# Patient Record
Sex: FEMALE | URBAN - METROPOLITAN AREA
[De-identification: names, ages, dates, MRNs, and addresses within clinical notes are randomized per-mention and may not be internally consistent; named-entity substitution may affect disease eponyms.]

---

## 2019-06-10 ENCOUNTER — APPOINTMENT (OUTPATIENT)
Age: 54
Setting detail: DERMATOLOGY
End: 2019-06-10

## 2019-06-10 DIAGNOSIS — L82.1 OTHER SEBORRHEIC KERATOSIS: ICD-10-CM

## 2019-06-10 DIAGNOSIS — L81.4 OTHER MELANIN HYPERPIGMENTATION: ICD-10-CM

## 2019-06-10 PROCEDURE — OTHER EDUCATIONAL RESOURCES PROVIDED: OTHER

## 2019-06-10 PROCEDURE — 99202 OFFICE O/P NEW SF 15 MIN: CPT

## 2019-06-10 PROCEDURE — OTHER COUNSELING: OTHER

## 2019-06-10 ASSESSMENT — LOCATION DETAILED DESCRIPTION DERM
LOCATION DETAILED: RIGHT SUPERIOR LATERAL MALAR CHEEK
LOCATION DETAILED: RIGHT CENTRAL MALAR CHEEK

## 2019-06-10 ASSESSMENT — LOCATION ZONE DERM: LOCATION ZONE: FACE

## 2019-06-10 ASSESSMENT — LOCATION SIMPLE DESCRIPTION DERM: LOCATION SIMPLE: RIGHT CHEEK

## 2019-06-10 NOTE — HPI: SKIN LESION
What Type Of Note Output Would You Prefer (Optional)?: Bullet Format
How Severe Is Your Skin Lesion?: mild
Has Your Skin Lesion Been Treated?: not been treated
Is This A New Presentation, Or A Follow-Up?: Skin Lesions
Which Family Member (Optional)?: Grandfather

## 2020-05-23 ENCOUNTER — APPOINTMENT (OUTPATIENT)
Dept: CT IMAGING | Facility: CLINIC | Age: 55
End: 2020-05-23
Attending: NURSE PRACTITIONER
Payer: COMMERCIAL

## 2020-05-23 ENCOUNTER — NURSE TRIAGE (OUTPATIENT)
Dept: NURSING | Facility: CLINIC | Age: 55
End: 2020-05-23

## 2020-05-23 ENCOUNTER — HOSPITAL ENCOUNTER (EMERGENCY)
Facility: CLINIC | Age: 55
Discharge: HOME | End: 2020-05-23
Attending: NURSE PRACTITIONER | Admitting: NURSE PRACTITIONER
Payer: COMMERCIAL

## 2020-05-23 VITALS
TEMPERATURE: 97.4 F | RESPIRATION RATE: 22 BRPM | SYSTOLIC BLOOD PRESSURE: 170 MMHG | HEART RATE: 58 BPM | DIASTOLIC BLOOD PRESSURE: 100 MMHG | OXYGEN SATURATION: 100 %

## 2020-05-23 DIAGNOSIS — N20.0 KIDNEY STONE: ICD-10-CM

## 2020-05-23 PROBLEM — Z96.649 STATUS POST THR (TOTAL HIP REPLACEMENT): Status: ACTIVE | Noted: 2018-01-08

## 2020-05-23 LAB
ALBUMIN UR-MCNC: NEGATIVE MG/DL
AMORPH CRY #/AREA URNS HPF: ABNORMAL /HPF
ANION GAP SERPL CALCULATED.3IONS-SCNC: 9 MMOL/L (ref 3–14)
APPEARANCE UR: ABNORMAL
BILIRUB UR QL STRIP: NEGATIVE
BUN SERPL-MCNC: 14 MG/DL (ref 7–30)
CALCIUM SERPL-MCNC: 8.9 MG/DL (ref 8.5–10.1)
CHLORIDE SERPL-SCNC: 108 MMOL/L (ref 94–109)
CO2 SERPL-SCNC: 23 MMOL/L (ref 20–32)
COLOR UR AUTO: ABNORMAL
CREAT SERPL-MCNC: 0.71 MG/DL (ref 0.52–1.04)
ERYTHROCYTE [DISTWIDTH] IN BLOOD BY AUTOMATED COUNT: 12.4 % (ref 10–15)
GFR SERPL CREATININE-BSD FRML MDRD: >90 ML/MIN/{1.73_M2}
GLUCOSE SERPL-MCNC: 115 MG/DL (ref 70–99)
GLUCOSE UR STRIP-MCNC: NEGATIVE MG/DL
HCT VFR BLD AUTO: 40.3 % (ref 35–47)
HGB BLD-MCNC: 13.4 G/DL (ref 11.7–15.7)
HGB UR QL STRIP: NEGATIVE
KETONES UR STRIP-MCNC: NEGATIVE MG/DL
LEUKOCYTE ESTERASE UR QL STRIP: NEGATIVE
MCH RBC QN AUTO: 30 PG (ref 26.5–33)
MCHC RBC AUTO-ENTMCNC: 33.3 G/DL (ref 31.5–36.5)
MCV RBC AUTO: 90 FL (ref 78–100)
MUCOUS THREADS #/AREA URNS LPF: PRESENT /LPF
NITRATE UR QL: NEGATIVE
PH UR STRIP: 7.5 PH (ref 5–7)
PLATELET # BLD AUTO: 300 10E9/L (ref 150–450)
POTASSIUM SERPL-SCNC: 3.5 MMOL/L (ref 3.4–5.3)
RBC # BLD AUTO: 4.46 10E12/L (ref 3.8–5.2)
RBC #/AREA URNS AUTO: 2 /HPF (ref 0–2)
SODIUM SERPL-SCNC: 140 MMOL/L (ref 133–144)
SOURCE: ABNORMAL
SP GR UR STRIP: 1.02 (ref 1–1.03)
SQUAMOUS #/AREA URNS AUTO: <1 /HPF (ref 0–1)
UROBILINOGEN UR STRIP-MCNC: NORMAL MG/DL (ref 0–2)
WBC # BLD AUTO: 7.7 10E9/L (ref 4–11)
WBC #/AREA URNS AUTO: 4 /HPF (ref 0–5)

## 2020-05-23 PROCEDURE — 25800030 ZZH RX IP 258 OP 636: Performed by: NURSE PRACTITIONER

## 2020-05-23 PROCEDURE — 96361 HYDRATE IV INFUSION ADD-ON: CPT

## 2020-05-23 PROCEDURE — 99285 EMERGENCY DEPT VISIT HI MDM: CPT | Mod: 25

## 2020-05-23 PROCEDURE — 96374 THER/PROPH/DIAG INJ IV PUSH: CPT

## 2020-05-23 PROCEDURE — 80048 BASIC METABOLIC PNL TOTAL CA: CPT | Performed by: NURSE PRACTITIONER

## 2020-05-23 PROCEDURE — 96375 TX/PRO/DX INJ NEW DRUG ADDON: CPT

## 2020-05-23 PROCEDURE — 25000128 H RX IP 250 OP 636: Performed by: NURSE PRACTITIONER

## 2020-05-23 PROCEDURE — 25000132 ZZH RX MED GY IP 250 OP 250 PS 637: Performed by: NURSE PRACTITIONER

## 2020-05-23 PROCEDURE — 74176 CT ABD & PELVIS W/O CONTRAST: CPT

## 2020-05-23 PROCEDURE — 85027 COMPLETE CBC AUTOMATED: CPT | Performed by: NURSE PRACTITIONER

## 2020-05-23 PROCEDURE — 81001 URINALYSIS AUTO W/SCOPE: CPT | Performed by: NURSE PRACTITIONER

## 2020-05-23 RX ORDER — KETOROLAC TROMETHAMINE 15 MG/ML
15 INJECTION, SOLUTION INTRAMUSCULAR; INTRAVENOUS ONCE
Status: COMPLETED | OUTPATIENT
Start: 2020-05-23 | End: 2020-05-23

## 2020-05-23 RX ORDER — HYDROMORPHONE HYDROCHLORIDE 1 MG/ML
0.5 INJECTION, SOLUTION INTRAMUSCULAR; INTRAVENOUS; SUBCUTANEOUS ONCE
Status: COMPLETED | OUTPATIENT
Start: 2020-05-23 | End: 2020-05-23

## 2020-05-23 RX ORDER — ONDANSETRON 2 MG/ML
4 INJECTION INTRAMUSCULAR; INTRAVENOUS ONCE
Status: COMPLETED | OUTPATIENT
Start: 2020-05-23 | End: 2020-05-23

## 2020-05-23 RX ORDER — DIMENHYDRINATE 50 MG
50 TABLET ORAL ONCE
Status: COMPLETED | OUTPATIENT
Start: 2020-05-23 | End: 2020-05-23

## 2020-05-23 RX ADMIN — KETOROLAC TROMETHAMINE 15 MG: 15 INJECTION, SOLUTION INTRAMUSCULAR; INTRAVENOUS at 20:53

## 2020-05-23 RX ADMIN — DIMENHYDRINATE 50 MG: 50 TABLET ORAL at 20:55

## 2020-05-23 RX ADMIN — ONDANSETRON 4 MG: 2 INJECTION INTRAMUSCULAR; INTRAVENOUS at 20:53

## 2020-05-23 RX ADMIN — HYDROMORPHONE HYDROCHLORIDE 0.5 MG: 1 INJECTION, SOLUTION INTRAMUSCULAR; INTRAVENOUS; SUBCUTANEOUS at 20:55

## 2020-05-23 RX ADMIN — SODIUM CHLORIDE 1000 ML: 9 INJECTION, SOLUTION INTRAVENOUS at 20:55

## 2020-05-23 ASSESSMENT — ENCOUNTER SYMPTOMS
VOMITING: 0
CHILLS: 0
NAUSEA: 1
FEVER: 0
ABDOMINAL PAIN: 1
FLANK PAIN: 1

## 2020-05-23 NOTE — ED AVS SNAPSHOT
[facility] Emergency Department  [address]  [facility] [address]  Phone:  [phone]  Fax:  [phone]                                    Flores Sandhu   MRN: [id number]    Department:  [facility] Emergency Department   Date of Visit:  5/23/2020           After Visit Summary Signature Page    I have received my discharge instructions, and my questions have been answered. I have discussed any challenges I see with this plan with the nurse or doctor.    ..........................................................................................................................................  Patient/Patient Representative Signature      ..........................................................................................................................................  Patient Representative Print Name and Relationship to Patient    ..................................................               ................................................  Date                                   Time    ..........................................................................................................................................  Reviewed by Signature/Title    ...................................................              ..............................................  Date                                               Time          22EPIC Rev 08/18

## 2020-05-24 NOTE — ED PROVIDER NOTES
History     Chief Complaint:  Flank Pain    HPI  Flores Sandhu is a 55 year old female with a history of uterine fibroids who presents to the emergency department for evaluation of right sided flank pain. This came on suddenly an hour ago in her low back and then localized to her right flank. The patient also reports urinary urgency and some pelvic pain. She denies any blood in her urine.       Allergies:  Penicillins     Medications:    The patient is not currently taking any prescribed medications.    Past Medical History:    Osteoarthritis  Varicose veins  DJD  Adenomyosis  Uterine fibroid  Menometrorrhagia   Diverticulitis     Past Surgical History:    Bladder repair    Family History:    The patient's family history is not on file.    Social History:  The patient arrives alone  Marital Status:  [2]      Review of Systems   Constitutional: Negative for chills and fever.   Gastrointestinal: Positive for abdominal pain and nausea. Negative for vomiting.   Genitourinary: Positive for flank pain and urgency.   All other systems reviewed and are negative.      Physical Exam     Patient Vitals for the past 24 hrs:   BP Temp Temp src Pulse Resp SpO2   05/23/20 2142 -- -- -- -- -- 100 %   05/23/20 2137 -- -- -- -- -- 99 %   05/23/20 2136 (!) 170/100 -- -- 58 -- --   05/23/20 2032 (!) 167/111 97.4  F (36.3  C) Oral 72 22 100 %     Physical Exam  General: Alert, Moderate/severe discomfort, well kept  Eyes: PERRL, conjunctivae pink no scleral icterus or conjunctival injection  ENT:   Moist mucus membranes, posterior oropharynx clear without erythema or exudates, No lymphadenopathy, Normal voice  Resp:  Lungs clear to auscultation bilaterally, no crackles/rubs/wheezes. Good air movement  CV:  Normal rate and rhythm, no murmurs/rubs/gallops  GI:  Abdomen soft and non-distended.  Normoactive BS. Right CVA and lower abd  tenderness, No guarding or rebound, No masses  Skin:  Warm, dry.  No rashes or  petechiae  Musculoskeletal: No peripheral edema or calf tenderness, Normal gross ROM   Neuro: Alert and oriented to person/place/time, normal sensation  Psychiatric: Normal affect, cooperative, good eye contact    Emergency Department Course     Imaging:  Radiology findings were communicated with the patient who voiced understanding of the findings.    CT Abdomen Pelvis wo contrast   IMPRESSION:   1.  Right hydronephrosis and hydroureter secondary to a recently passed 2 mm stone in the bladder.   2.  Extensive colonic diverticulosis   Reading per radiology    Laboratory:  Laboratory findings were communicated with the patient who voiced understanding of the findings.    CBC:  o/w WNL. (WBC 7.7, HGB 13.4, )   BMP: Glucose 115 (H), o/w WNL (Creatinine: 0.71)    UA: pH urine, mucus present, amorphous crystals few o/w WNL    Interventions:  2053 Toradol, 15 mg, IV  2053 Zofran 4mg IV injection   2055 Dilaudid 0.5mg IV injection  2055 Dramamine 50 mg PO  2055 NS 1L IV Bolus    Emergency Department Course:  Past medical records, nursing notes, and vitals reviewed.  2040: I performed an exam of the patient and obtained history, as documented above.     IV was inserted and blood was drawn for laboratory testing, results above.  The patient was sent for a CT while in the emergency department, findings above    2149: I rechecked the patient. Explained findings to patient.    I personally reviewed the laboratory and imaging results with the Patient and answered all related questions prior to discharge.     Findings and plan explained to the Patient. Patient discharged home with instructions regarding supportive care, medications, and reasons to return. The importance of close follow-up was reviewed.   Impression & Plan      Medical Decision Making:  Flores Sandhu is a 55 year old female who presented with unilateral flank & abdominal pain consistent with renal colic. CT confirms a 2mm ureteral stone in the bladder.   Renal function is normal/baseline.  CT and lab workup show no other alternative etiology that could be causing her symptoms (e.g., AAA, appendicitis, pyelonephritis). There is no fever or convincing evidence of a urinary tract infection. On recheck, her pain is controlled with interventions in the ED and she is tolerating POs. I will prescribe supportive medications and Dramamine to facilitate stone passage. I have advised her to return for uncontrolled pain, vomiting, fever, or any other concerning symptoms. I also advised to strain her urine to look for a stone and submit it to her primary doctor for lab analysis.  Finally, I have advised follow up with urology or primary care within 3-5 days.      Diagnosis:    ICD-10-CM   1. Kidney stone  N20.0       Disposition:   discharged to home      Scribe Disclosure:  I, Brittni Swanson, am serving as a scribe at 8:40 PM on 5/23/2020 to document services personally performed by Surya Law APRN based on my observations and the provider's statements to me.    [facility] EMERGENCY DEPARTMENT     Surya Law APRN CNP  05/23/20 2208

## 2020-05-24 NOTE — TELEPHONE ENCOUNTER
Severe right lower back pain that goes to abdomen.  Will go to ER.  Denies Covid-19 symptoms.    Berenice Mooney RN  [facility]          Reason for Disposition    [1] SEVERE pain (e.g., excruciating, scale 8-10) AND [2] present > 1 hour    Additional Information    Negative: Passed out (i.e., lost consciousness, collapsed and was not responding)    Negative: Shock suspected (e.g., cold/pale/clammy skin, too weak to stand, low BP, rapid pulse)    Negative: Difficult to awaken or acting confused (e.g., disoriented, slurred speech)    Negative: Sounds like a life-threatening emergency to the triager    Protocols used: FLANK PAIN-A-AH

## 2022-11-30 ENCOUNTER — APPOINTMENT (OUTPATIENT)
Dept: CT IMAGING | Facility: CLINIC | Age: 57
End: 2022-11-30
Attending: EMERGENCY MEDICINE
Payer: COMMERCIAL

## 2022-11-30 ENCOUNTER — HOSPITAL ENCOUNTER (EMERGENCY)
Facility: CLINIC | Age: 57
Discharge: HOME | End: 2022-11-30
Attending: EMERGENCY MEDICINE | Admitting: EMERGENCY MEDICINE
Payer: COMMERCIAL

## 2022-11-30 VITALS
TEMPERATURE: 99 F | DIASTOLIC BLOOD PRESSURE: 117 MMHG | WEIGHT: 186 LBS | RESPIRATION RATE: 18 BRPM | SYSTOLIC BLOOD PRESSURE: 187 MMHG | OXYGEN SATURATION: 100 % | HEART RATE: 80 BPM

## 2022-11-30 DIAGNOSIS — R00.2 PALPITATIONS: ICD-10-CM

## 2022-11-30 DIAGNOSIS — I10 UNCONTROLLED HYPERTENSION: ICD-10-CM

## 2022-11-30 LAB
ALBUMIN UR-MCNC: NEGATIVE MG/DL
ANION GAP SERPL CALCULATED.3IONS-SCNC: 11 MMOL/L (ref 7–15)
APPEARANCE UR: CLEAR
BASOPHILS # BLD AUTO: 0 10E3/UL (ref 0–0.2)
BASOPHILS NFR BLD AUTO: 1 %
BILIRUB UR QL STRIP: NEGATIVE
BUN SERPL-MCNC: 14.8 MG/DL (ref 6–20)
CALCIUM SERPL-MCNC: 9.8 MG/DL (ref 8.6–10)
CHLORIDE SERPL-SCNC: 103 MMOL/L (ref 98–107)
COLOR UR AUTO: NORMAL
CREAT SERPL-MCNC: 0.78 MG/DL (ref 0.51–0.95)
DEPRECATED HCO3 PLAS-SCNC: 26 MMOL/L (ref 22–29)
EOSINOPHIL # BLD AUTO: 0.3 10E3/UL (ref 0–0.7)
EOSINOPHIL NFR BLD AUTO: 5 %
ERYTHROCYTE [DISTWIDTH] IN BLOOD BY AUTOMATED COUNT: 12.5 % (ref 10–15)
GFR SERPL CREATININE-BSD FRML MDRD: 89 ML/MIN/1.73M2
GLUCOSE SERPL-MCNC: 116 MG/DL (ref 70–99)
GLUCOSE UR STRIP-MCNC: NEGATIVE MG/DL
HCT VFR BLD AUTO: 42.1 % (ref 35–47)
HGB BLD-MCNC: 13.7 G/DL (ref 11.7–15.7)
HGB UR QL STRIP: NEGATIVE
HOLD SPECIMEN: NORMAL
HOLD SPECIMEN: NORMAL
IMM GRANULOCYTES # BLD: 0 10E3/UL
IMM GRANULOCYTES NFR BLD: 0 %
KETONES UR STRIP-MCNC: NEGATIVE MG/DL
LEUKOCYTE ESTERASE UR QL STRIP: NEGATIVE
LYMPHOCYTES # BLD AUTO: 1.8 10E3/UL (ref 0.8–5.3)
LYMPHOCYTES NFR BLD AUTO: 31 %
MCH RBC QN AUTO: 29.2 PG (ref 26.5–33)
MCHC RBC AUTO-ENTMCNC: 32.5 G/DL (ref 31.5–36.5)
MCV RBC AUTO: 90 FL (ref 78–100)
MONOCYTES # BLD AUTO: 0.3 10E3/UL (ref 0–1.3)
MONOCYTES NFR BLD AUTO: 6 %
NEUTROPHILS # BLD AUTO: 3.4 10E3/UL (ref 1.6–8.3)
NEUTROPHILS NFR BLD AUTO: 57 %
NITRATE UR QL: NEGATIVE
NRBC # BLD AUTO: 0 10E3/UL
NRBC BLD AUTO-RTO: 0 /100
PH UR STRIP: 6.5 [PH] (ref 5–7)
PLATELET # BLD AUTO: 263 10E3/UL (ref 150–450)
POTASSIUM SERPL-SCNC: 3.6 MMOL/L (ref 3.4–5.3)
RBC # BLD AUTO: 4.69 10E6/UL (ref 3.8–5.2)
RBC URINE: <1 /HPF
SODIUM SERPL-SCNC: 140 MMOL/L (ref 136–145)
SP GR UR STRIP: 1.02 (ref 1–1.03)
SQUAMOUS EPITHELIAL: 1 /HPF
TROPONIN T SERPL HS-MCNC: <6 NG/L
UROBILINOGEN UR STRIP-MCNC: NORMAL MG/DL
WBC # BLD AUTO: 5.8 10E3/UL (ref 4–11)
WBC URINE: <1 /HPF

## 2022-11-30 PROCEDURE — 80048 BASIC METABOLIC PNL TOTAL CA: CPT | Performed by: EMERGENCY MEDICINE

## 2022-11-30 PROCEDURE — 36415 COLL VENOUS BLD VENIPUNCTURE: CPT | Performed by: EMERGENCY MEDICINE

## 2022-11-30 PROCEDURE — 85025 COMPLETE CBC W/AUTO DIFF WBC: CPT | Performed by: EMERGENCY MEDICINE

## 2022-11-30 PROCEDURE — 81001 URINALYSIS AUTO W/SCOPE: CPT | Performed by: EMERGENCY MEDICINE

## 2022-11-30 PROCEDURE — 99285 EMERGENCY DEPT VISIT HI MDM: CPT | Mod: 25

## 2022-11-30 PROCEDURE — 84484 ASSAY OF TROPONIN QUANT: CPT | Performed by: EMERGENCY MEDICINE

## 2022-11-30 PROCEDURE — 93005 ELECTROCARDIOGRAM TRACING: CPT

## 2022-11-30 PROCEDURE — 70450 CT HEAD/BRAIN W/O DYE: CPT

## 2022-11-30 RX ORDER — HYDROCHLOROTHIAZIDE 12.5 MG/1
12.5 TABLET ORAL DAILY
Qty: 30 TABLET | Refills: 0 | Status: SHIPPED | OUTPATIENT
Start: 2022-11-30 | End: 2022-12-28

## 2022-11-30 ASSESSMENT — ENCOUNTER SYMPTOMS
HEADACHES: 1
PALPITATIONS: 1

## 2022-11-30 ASSESSMENT — ACTIVITIES OF DAILY LIVING (ADL): ADLS_ACUITY_SCORE: 35

## 2022-11-30 NOTE — ED NOTES
Rapid Assessment Note    History:   Flores Sandhu is a 57 year old female who presents with hypertension. The patient states that she went to see her parents for a couple days and forgot to bring her Losartan. She notes that the two days she missed them, she had headaches. The day she returned, she took her medications immediately, but experienced two episodes of a full body throbbing sensation, both of which woke her up around 4:30 AM (11/19 and 11/26) Both days, she checked her pulse and they were 94 bpm and 89 bpm respectively. She denies any recent significant life changes.     Exam:   General:  Alert, interactive  Cardiovascular:  Well perfused  Lungs:  No respiratory distress, no accessory muscle use  Neuro:  Moving all 4 extremities  Skin:  Warm, dry  Psych:  Normal affect    Plan of Care:   I evaluated the patient and developed an initial plan of care. I discussed this plan and explained that I, or one of my partners, would be returning to complete the evaluation.     I, Wero Curran, am serving as a scribe to document services personally performed by Geo Crowell MD, based on my observations and the provider's statements to me.    11/30/2022  [facility] [employer] [facility]    Portions of this medical record were completed by a scribe. UPON MY REVIEW AND AUTHENTICATION BY ELECTRONIC SIGNATURE, this confirms (a) I performed the applicable clinical services, and (b) the record is accurate.        Geo Crowell MD  11/30/22 [id number]

## 2022-11-30 NOTE — DISCHARGE INSTRUCTIONS
Please follow-up in cardiology clinic as we discussed.  You will need to have a heart monitor placed to evaluate the palpitations.    Your new blood pressure medication is called hydrochlorothiazide.  You will need to have repeat lab work checked within 1 week either in the cardiology clinic or with your primary care clinic.

## 2022-11-30 NOTE — ED PROVIDER NOTES
History   Chief Complaint:  Hypertension       The history is provided by the patient.      Flores Sandhu is a 57 year old female who presents with hypertension. The patient states that she went to see her parents for a couple days and forgot to bring her Losartan. She notes that the two days she missed them, she had headaches. The day she returned, she took her medications immediately, but experienced two episodes of a full body throbbing sensation, both of which woke her up around 4:30 AM (11/19 and 11/26) Both days, she checked her pulse and they were 94 bpm and 89 bpm respectively. She denies any recent significant life changes.    Review of Systems   Cardiovascular: Positive for palpitations.   Neurological: Positive for headaches.   All other systems reviewed and are negative.    Allergies:  Penicillins  Sulfa Drugs  Lisinopril    Medications:  Losartan    Past Medical History:     Adenomyosis  Menometrorrhagia  Uterine fibroid  DJD  Diverticulitis  PMS    Past Surgical History:    Bladder repair  Meniscus repair  Hip replacement  Knee replacement  Total abdominal hysterectomy  Incontinence surgery    Social History:  The patient presents to the ED alone.  PCP: Zaida Gandara     Physical Exam     No data found.    Physical Exam  Constitutional:       General: She is not in acute distress.     Appearance: She is not diaphoretic.   HENT:      Head: Atraumatic.      Mouth/Throat:      Pharynx: No oropharyngeal exudate.   Eyes:      General: No scleral icterus.     Pupils: Pupils are equal, round, and reactive to light.   Cardiovascular:      Heart sounds: Normal heart sounds.   Pulmonary:      Effort: No respiratory distress.      Breath sounds: Normal breath sounds.   Abdominal:      General: Bowel sounds are normal.      Palpations: Abdomen is soft.      Tenderness: There is no abdominal tenderness.   Musculoskeletal:         General: No tenderness.   Skin:     General: Skin is warm.      Findings: No  rash.       Emergency Department Course   ECG  ECG results from 11/30/22   EKG 12 lead     Value    Systolic Blood Pressure     Diastolic Blood Pressure     Ventricular Rate 73    Atrial Rate 73    NC Interval 170    QRS Duration 86        QTc 405    P Axis 72    R AXIS 54    T Axis 29    Interpretation ECG      Sinus rhythm  Normal ECG  No previous ECGs available  Confirmed by - EMERGENCY ROOM, PHYSICIAN ([id number]),  MARY GRACE SERRA (Latonya) on 12/1/2022 7:23:31 AM         Imaging:  CT Head w/o Contrast   Final Result   IMPRESSION: No evidence of acute intracranial hemorrhage, mass, or   herniation.         CARY CORDERO MD            SYSTEM ID:  [id number]        Report per radiology    Laboratory:  Labs Ordered and Resulted from Time of ED Arrival to Time of ED Departure   BASIC METABOLIC PANEL - Abnormal       Result Value    Sodium 140      Potassium 3.6      Chloride 103      Carbon Dioxide (CO2) 26      Anion Gap 11      Urea Nitrogen 14.8      Creatinine 0.78      Calcium 9.8      Glucose 116 (*)     GFR Estimate 89     TROPONIN T, HIGH SENSITIVITY - Normal    Troponin T, High Sensitivity <6     ROUTINE UA WITH MICROSCOPIC REFLEX TO CULTURE - Normal    Color Urine Light Yellow      Appearance Urine Clear      Glucose Urine Negative      Bilirubin Urine Negative      Ketones Urine Negative      Specific Gravity Urine 1.016      Blood Urine Negative      pH Urine 6.5      Protein Albumin Urine Negative      Urobilinogen Urine Normal      Nitrite Urine Negative      Leukocyte Esterase Urine Negative      RBC Urine <1      WBC Urine <1      Squamous Epithelials Urine 1     CBC WITH PLATELETS AND DIFFERENTIAL    WBC Count 5.8      RBC Count 4.69      Hemoglobin 13.7      Hematocrit 42.1      MCV 90      MCH 29.2      MCHC 32.5      RDW 12.5      Platelet Count 263      % Neutrophils 57      % Lymphocytes 31      % Monocytes 6      % Eosinophils 5      % Basophils 1      % Immature Granulocytes 0      NRBCs  per 100 WBC 0      Absolute Neutrophils 3.4      Absolute Lymphocytes 1.8      Absolute Monocytes 0.3      Absolute Eosinophils 0.3      Absolute Basophils 0.0      Absolute Immature Granulocytes 0.0      Absolute NRBCs 0.0        Emergency Department Course:     Reviewed:  I reviewed nursing notes, vitals, past medical history and Care Everywhere    Assessments:  1542 I obtained history and examined the patient as noted above.   1656 I rechecked the patient, explained findings, and prepared for discharge.     Disposition:  The patient was discharged to home.     Impression & Plan   Medical Decision Making:  Work-up is reassuring.  No signs of endorgan dysfunction from hypertension.  Given her palpitations she will be referred to cardiology for consideration of a Zio patch and for recheck of her blood pressure.  We discussed management options and have elected to start low-dose hydrochlorothiazide today.  She will need recheck of her electrolytes as well.  Return precautions discussed.    Diagnosis:    ICD-10-CM    1. Uncontrolled hypertension  I10 Follow-Up with Cardiology      2. Palpitations  R00.2 Follow-Up with Cardiology     Leadless EKG Monitor 3 to 7 Days          Discharge Medications:  Discharge Medication List as of 11/30/2022  5:15 PM      START taking these medications    Details   hydrochlorothiazide (HYDRODIURIL) 12.5 MG tablet Take 1 tablet (12.5 mg) by mouth daily for 30 days, Disp-30 tablet, R-0, E-Prescribe             Scribe Disclosure:  I, Wero Curran, am serving as a scribe at 4:56 PM on 11/30/2022 to document services personally performed by Geo Crowell, based on my observations and the provider's statements to me.            Geo Crowell MD  12/13/22 3072

## 2022-11-30 NOTE — ED NOTES
Discharge instructions reviewed. Patient agrees to monitor her blood pressure, take her blood pressure medications and follow up with cardiology, leadless EKG monitoring and her doctor. Denies questions or concerns at the time of discharge.

## 2022-11-30 NOTE — ED TRIAGE NOTES
"Pt has been waking up throughout the night with palpitations the past week. Pt called to have PCP appt for BP check. Triage nurse had pt take BP at home today which was elevated so recommended pt come to ED. Here, pt also c/o throbbing HA since 1230. States she has been having intermittent headaches \"for a while now.\" ABCs intact. No unilateral weakness, difficulty ambulating, vision changes, or any other sx.     "

## 2022-12-01 ENCOUNTER — HOSPITAL ENCOUNTER (OUTPATIENT)
Dept: CARDIOLOGY | Facility: CLINIC | Age: 57
Discharge: HOME | End: 2022-12-01
Attending: EMERGENCY MEDICINE | Admitting: EMERGENCY MEDICINE
Payer: COMMERCIAL

## 2022-12-01 DIAGNOSIS — R00.2 PALPITATIONS: ICD-10-CM

## 2022-12-01 LAB
ATRIAL RATE - MUSE: 73 BPM
DIASTOLIC BLOOD PRESSURE - MUSE: NORMAL MMHG
INTERPRETATION ECG - MUSE: NORMAL
P AXIS - MUSE: 72 DEGREES
PR INTERVAL - MUSE: 170 MS
QRS DURATION - MUSE: 86 MS
QT - MUSE: 368 MS
QTC - MUSE: 405 MS
R AXIS - MUSE: 54 DEGREES
SYSTOLIC BLOOD PRESSURE - MUSE: NORMAL MMHG
T AXIS - MUSE: 29 DEGREES
VENTRICULAR RATE- MUSE: 73 BPM

## 2022-12-01 PROCEDURE — 93242 EXT ECG>48HR<7D RECORDING: CPT

## 2022-12-01 PROCEDURE — 93244 EXT ECG>48HR<7D REV&INTERPJ: CPT | Performed by: INTERNAL MEDICINE

## 2022-12-28 ENCOUNTER — LAB (OUTPATIENT)
Dept: LAB | Facility: CLINIC | Age: 57
End: 2022-12-28
Payer: COMMERCIAL

## 2022-12-28 ENCOUNTER — OFFICE VISIT (OUTPATIENT)
Dept: CARDIOLOGY | Facility: CLINIC | Age: 57
End: 2022-12-28
Attending: EMERGENCY MEDICINE
Payer: COMMERCIAL

## 2022-12-28 VITALS
OXYGEN SATURATION: 98 % | SYSTOLIC BLOOD PRESSURE: 154 MMHG | HEIGHT: 66 IN | BODY MASS INDEX: 30.91 KG/M2 | WEIGHT: 192.3 LBS | HEART RATE: 72 BPM | DIASTOLIC BLOOD PRESSURE: 102 MMHG

## 2022-12-28 DIAGNOSIS — I10 UNCONTROLLED HYPERTENSION: ICD-10-CM

## 2022-12-28 DIAGNOSIS — G47.33 OBSTRUCTIVE SLEEP APNEA SYNDROME: ICD-10-CM

## 2022-12-28 DIAGNOSIS — R00.2 PALPITATIONS: ICD-10-CM

## 2022-12-28 DIAGNOSIS — R06.09 DYSPNEA ON EXERTION: Primary | ICD-10-CM

## 2022-12-28 LAB
ANION GAP SERPL CALCULATED.3IONS-SCNC: 11 MMOL/L (ref 7–15)
BUN SERPL-MCNC: 9.3 MG/DL (ref 6–20)
CALCIUM SERPL-MCNC: 9.8 MG/DL (ref 8.6–10)
CHLORIDE SERPL-SCNC: 104 MMOL/L (ref 98–107)
CREAT SERPL-MCNC: 0.73 MG/DL (ref 0.51–0.95)
DEPRECATED HCO3 PLAS-SCNC: 25 MMOL/L (ref 22–29)
GFR SERPL CREATININE-BSD FRML MDRD: >90 ML/MIN/1.73M2
GLUCOSE SERPL-MCNC: 112 MG/DL (ref 70–99)
MAGNESIUM SERPL-MCNC: 2 MG/DL (ref 1.7–2.3)
POTASSIUM SERPL-SCNC: 4.1 MMOL/L (ref 3.4–5.3)
SODIUM SERPL-SCNC: 140 MMOL/L (ref 136–145)
TSH SERPL DL<=0.005 MIU/L-ACNC: 1.35 UIU/ML (ref 0.3–4.2)

## 2022-12-28 PROCEDURE — 84443 ASSAY THYROID STIM HORMONE: CPT | Performed by: INTERNAL MEDICINE

## 2022-12-28 PROCEDURE — 36415 COLL VENOUS BLD VENIPUNCTURE: CPT | Performed by: INTERNAL MEDICINE

## 2022-12-28 PROCEDURE — 80048 BASIC METABOLIC PNL TOTAL CA: CPT | Performed by: INTERNAL MEDICINE

## 2022-12-28 PROCEDURE — 83735 ASSAY OF MAGNESIUM: CPT | Performed by: INTERNAL MEDICINE

## 2022-12-28 PROCEDURE — 99205 OFFICE O/P NEW HI 60 MIN: CPT | Performed by: INTERNAL MEDICINE

## 2022-12-28 RX ORDER — AMLODIPINE BESYLATE 5 MG/1
5 TABLET ORAL DAILY
Qty: 90 TABLET | Refills: 3 | Status: SHIPPED | OUTPATIENT
Start: 2022-12-28 | End: 2023-07-11 | Stop reason: DRUGHIGH

## 2022-12-28 RX ORDER — LOSARTAN POTASSIUM 100 MG/1
1 TABLET ORAL DAILY
COMMUNITY
Start: 2022-12-26

## 2022-12-28 RX ORDER — LOSARTAN POTASSIUM 50 MG/1
50 TABLET ORAL DAILY
COMMUNITY
Start: 2022-09-21 | End: 2022-12-28

## 2022-12-28 NOTE — PATIENT INSTRUCTIONS
December 28, 2022    Thank you for allowing our Cardiology team to participate in your care.     Please note the following changes to your heart treatment plan:     Medication changes:   - start amlodipine 5mg daily    Tests to be done:  - non-fasting labs  - TTE (heart ultrasound)  - Exercise stress test    Follow up:  - Follow up in about 1-2 months with cardiology SYLVIA, or sooner as needed.      For scheduling, please call [phone].    Please contact our team at [phone] (Carmita VALLEJO) or [phone] for any questions or concerns.     If you are having a medical emergency, please call [phone].     Sincerely,    Addi Ford MD, FACC  Cardiology    [facility]

## 2022-12-28 NOTE — PROGRESS NOTES
"    Cardiology Clinic Consultation:    December 28, 2022   Patient Name: Flores Sandhu  Patient MRN: [id number]    Consult indication: HTN    HPI and Assessment and Plan/Recommendations:    Please see dictation. #[id number]    Thank you for allowing our team to participate in the care of Flores Sandhu.  Please do not hesitate to call or page me with any questions or concerns.    Sincerely,     Addi Ford MD, [facility]  Cardiology  December 28, 2022    cc  Geo Crowell MD  [address]  [place] [address]    Total time spent on this encounter: 60 minutes, providing care in this encounter including, but not limited to, reviewing prior medical records, laboratory data, imaging studies, diagnostic studies, procedure notes, formulating an assessment and plan, recommendations, discussion and counseling with patient face to face, dictation.    Past Medical History:     Patient Active Problem List   Diagnosis     Status post THR (total hip replacement)     Palpitations     Uncontrolled hypertension       Past Surgical History:   No prior cardiac surgical history    Medications (outpatient):  Current Outpatient Medications   Medication Sig Dispense Refill     amLODIPine (NORVASC) 5 MG tablet Take 1 tablet (5 mg) by mouth daily 90 tablet 3     losartan (COZAAR) 100 MG tablet Take 1 tablet by mouth daily         Allergies:  Allergies   Allergen Reactions     Penicillins Unknown     4/27/2015 Test dosed cefazolin with no side effects seen     Sulfa Drugs Hives       Social History:   No smoking history  Occasional EtOH use    Review of Systems:   A comprehensive 12 system review of systems was carried out.  Pertinent positives and negatives are noted above. Otherwise negative for contributory information.    Objective & Physical Exam:  BP (!) 154/102 (BP Location: Right arm, Patient Position: Sitting, Cuff Size: Adult Regular)   Pulse 72   Ht 1.676 m (5' 6\")   Wt 87.2 kg (192 lb 4.8 " oz)   SpO2 98%   BMI 31.04 kg/m    Wt Readings from Last 2 Encounters:   12/28/22 87.2 kg (192 lb 4.8 oz)   11/30/22 84.4 kg (186 lb)     Body mass index is 31.04 kg/m .   Body surface area is 2.02 meters squared.    Constitutional: appears stated age, in no apparent distress, appears to be well nourished  Head: normocephalic, atraumatic  Neck: supple, trachea midline  Pulmonary: clear to auscultation bilaterally, no wheezes, no rales, no increased work of breathing  Cardiovascular: JVP normal, regular rate, regular rhythm, normal S1 and S2, no S3, S4, no murmur appreciated, no lower extremity edema  Gastrointestinal: no guarding, non-rigid   Neurologic: awake, alert, moves all extremities  Skin: no jaundice, warm on limited exam  Psychiatric: affect is normal, answers questions appropriately, oriented to self and place    Data reviewed:  Lab Results   Component Value Date    WBC 5.8 11/30/2022    WBC 7.7 05/23/2020    RBC 4.69 11/30/2022    RBC 4.46 05/23/2020    HGB 13.7 11/30/2022    HGB 13.4 05/23/2020    HCT 42.1 11/30/2022    HCT 40.3 05/23/2020    MCV 90 11/30/2022    MCV 90 05/23/2020    MCH 29.2 11/30/2022    MCH 30.0 05/23/2020    MCHC 32.5 11/30/2022    MCHC 33.3 05/23/2020    RDW 12.5 11/30/2022    RDW 12.4 05/23/2020     11/30/2022     05/23/2020     Sodium   Date Value Ref Range Status   11/30/2022 140 136 - 145 mmol/L Final   05/23/2020 140 133 - 144 mmol/L Final     Potassium   Date Value Ref Range Status   11/30/2022 3.6 3.4 - 5.3 mmol/L Final   05/23/2020 3.5 3.4 - 5.3 mmol/L Final     Chloride   Date Value Ref Range Status   11/30/2022 103 98 - 107 mmol/L Final   05/23/2020 108 94 - 109 mmol/L Final     Carbon Dioxide   Date Value Ref Range Status   05/23/2020 23 20 - 32 mmol/L Final     Carbon Dioxide (CO2)   Date Value Ref Range Status   11/30/2022 26 22 - 29 mmol/L Final     Anion Gap   Date Value Ref Range Status   11/30/2022 11 7 - 15 mmol/L Final   05/23/2020 9 3 - 14 mmol/L  Final     Glucose   Date Value Ref Range Status   11/30/2022 116 (H) 70 - 99 mg/dL Final   05/23/2020 115 (H) 70 - 99 mg/dL Final     Urea Nitrogen   Date Value Ref Range Status   11/30/2022 14.8 6.0 - 20.0 mg/dL Final   05/23/2020 14 7 - 30 mg/dL Final     Creatinine   Date Value Ref Range Status   11/30/2022 0.78 0.51 - 0.95 mg/dL Final   05/23/2020 0.71 0.52 - 1.04 mg/dL Final     GFR Estimate   Date Value Ref Range Status   11/30/2022 89 >60 mL/min/1.73m2 Final     Comment:     Effective December 21, 2021 eGFRcr in adults is calculated using the 2021 CKD-EPI creatinine equation which includes age and gender (Jimbo et al., NEJM, DOI: 10.1056/VDZBis4517455)   05/23/2020 >90 >60 mL/min/[1.73_m2] Final     Comment:     Non  GFR Calc  Starting 12/18/2018, serum creatinine based estimated GFR (eGFR) will be   calculated using the Chronic Kidney Disease Epidemiology Collaboration   (CKD-EPI) equation.       Calcium   Date Value Ref Range Status   11/30/2022 9.8 8.6 - 10.0 mg/dL Final   05/23/2020 8.9 8.5 - 10.1 mg/dL Final

## 2022-12-28 NOTE — PROGRESS NOTES
Service Date: 12/28/2022    CONSULT INDICATION:  Hypertension.    HISTORY OF PRESENT ILLNESS:      I had the opportunity to see patient, Flores Sandhu, today in Cardiology Clinic for a consultation.  She is followed by our colleague, Brittany Frye, with Primary Care.    As you know, patient is a pleasant 57-year-old female with a past medical history significant for obesity and hypertension, who presents for further evaluation and management of hypertension.    The patient reports that at baseline, she exercises regularly by walking for 2-4 miles almost every day.  She also engages in exercises such as yoga.  She denies any chest pain, chest pressure or associated palpitations.  However, she has noticed more dyspnea on exertion over the past couple of months or so.  She was recently visiting her parents and forgot to bring her losartan and so she had missed approximately 2 days of her losartan and developed symptoms consistent with hypertension, such as headache.  She resumed her losartan.  However, her blood pressures remained elevated.  She called her primary care team and was directed to the Emergency Department.  In the Emergency Department 11/30/2022, ECG demonstrated normal sinus rhythm without acute ischemic changes.  A CT head was negative.  Labs were unremarkable.  High-sensitivity troponin was normal.  Unfortunately, I do not see records of her blood pressure at that time; however, the patient notes that it was markedly elevated.  She was started on hydrochlorothiazide.  She filled the prescription, however, was told by the pharmacist that she should interface with her primary care team because she does have a sulfa allergy.  Rather than start the hydrochlorothiazide, she was directed to increase her dose of losartan from 50 mg daily to 100 mg daily.    The patient also has been experiencing some palpitations described as a skipped heartbeat sensation, as well as a prominent heartbeat sensation.  These  symptoms occur sporadically and occur primarily at night, waking her up from sleep.  A Zio Patch was done from 12/01-12/08/2022, which demonstrated normal sinus rhythm with less than 1% PACs and less than 1% PVCs.  The patient does report that she snores quite a bit and has been experiencing some daytime somnolence.    She drinks alcohol occasionally.  Does not smoke cigarettes.  The patient has been losing weight through diet and exercise; however, she did gain some weight back over the holiday season.  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    Blood pressure today in clinic was 154/102 mmHg.    ASSESSMENT AND PLAN/RECOMMENDATIONS:    1.  Hypertension.  Clinical history suggests possible underlying sleep apnea, which may be contributing to challenging to control blood pressures.  Unfortunately, due to a sulfa allergy, she is not a candidate for hydrochlorothiazide.  She remains on losartan 100 mg daily.  Blood pressure remains elevated today in clinic.  2.  Dyspnea on exertion, etiology not entirely clear.  Symptoms are not classically characteristic of myocardial ischemia; However, she does have risk factors of overweight, hypertension.  Suspect that symptoms may be related to poorly-controlled hypertension.  3.  Obesity.    -Discussed options for further evaluation and management.  -We will start amlodipine 5 mg daily.  Anticipate this will need to be increased; however, we will start at a lower dose for now.  -Continue losartan 100 mg daily.  -We will check basic labs including a BMP, and TSH.  -Exercise ECG treadmill stress test.  -TTE.  -Referral to Sleep Medicine.  -Follow up in 1-2 months with Cardiology [facility] or sooner as needed.    Thank you for allowing our team to participate in the care of patient, Flores Sandhu.  Please do not hesitate to page or call with any questions or concerns.    Addi Ford MD, [facility]   Cardiology  2022          D: 2022   T: 2022   MT: al    Name:     EDWIN DIXONAlma  MRN:      [id number]        Account:      [id number]   :      1965           Service Date: 2022       Document: [id number]

## 2022-12-28 NOTE — LETTER
12/28/2022    Brittany Frye PA-C  [facility] [address]  [facility] [address]    RE: Flores Sandhu       Dear Colleague,     I had the [facility].      Cardiology Clinic Consultation:    December 28, 2022   Patient Name: Flores Sandhu  Patient MRN: [id number]    Consult indication: HTN    HPI and Assessment and Plan/Recommendations:    Please see dictation. #[id number]    Thank you for allowing our team to participate in the care of Flores Sandhu.  Please do not hesitate to call or page me with any questions or concerns.    Sincerely,     Addi Ford MD, [facility]  Cardiology  December 28, 2022    cc  Geo Crowell MD  [address]  [place] [address]    Total time spent on this encounter: 60 minutes, providing care in this encounter including, but not limited to, reviewing prior medical records, laboratory data, imaging studies, diagnostic studies, procedure notes, formulating an assessment and plan, recommendations, discussion and counseling with patient face to face, dictation.    Past Medical History:     Patient Active Problem List   Diagnosis     Status post THR (total hip replacement)     Palpitations     Uncontrolled hypertension       Past Surgical History:   No prior cardiac surgical history    Medications (outpatient):  Current Outpatient Medications   Medication Sig Dispense Refill     amLODIPine (NORVASC) 5 MG tablet Take 1 tablet (5 mg) by mouth daily 90 tablet 3     losartan (COZAAR) 100 MG tablet Take 1 tablet by mouth daily         Allergies:  Allergies   Allergen Reactions     Penicillins Unknown     4/27/2015 Test dosed cefazolin with no side effects seen     Sulfa Drugs Hives       Social History:   No smoking history  Occasional EtOH use    Review of Systems:   A comprehensive 12 system review of systems was carried out.  Pertinent positives and negatives are noted above. Otherwise  "negative for contributory information.    Objective & Physical Exam:  BP (!) 154/102 (BP Location: Right arm, Patient Position: Sitting, Cuff Size: Adult Regular)   Pulse 72   Ht 1.676 m (5' 6\")   Wt 87.2 kg (192 lb 4.8 oz)   SpO2 98%   BMI 31.04 kg/m    Wt Readings from Last 2 Encounters:   12/28/22 87.2 kg (192 lb 4.8 oz)   11/30/22 84.4 kg (186 lb)     Body mass index is 31.04 kg/m .   Body surface area is 2.02 meters squared.    Constitutional: appears stated age, in no apparent distress, appears to be well nourished  Head: normocephalic, atraumatic  Neck: supple, trachea midline  Pulmonary: clear to auscultation bilaterally, no wheezes, no rales, no increased work of breathing  Cardiovascular: JVP normal, regular rate, regular rhythm, normal S1 and S2, no S3, S4, no murmur appreciated, no lower extremity edema  Gastrointestinal: no guarding, non-rigid   Neurologic: awake, alert, moves all extremities  Skin: no jaundice, warm on limited exam  Psychiatric: affect is normal, answers questions appropriately, oriented to self and place    Data reviewed:  Lab Results   Component Value Date    WBC 5.8 11/30/2022    WBC 7.7 05/23/2020    RBC 4.69 11/30/2022    RBC 4.46 05/23/2020    HGB 13.7 11/30/2022    HGB 13.4 05/23/2020    HCT 42.1 11/30/2022    HCT 40.3 05/23/2020    MCV 90 11/30/2022    MCV 90 05/23/2020    MCH 29.2 11/30/2022    MCH 30.0 05/23/2020    MCHC 32.5 11/30/2022    MCHC 33.3 05/23/2020    RDW 12.5 11/30/2022    RDW 12.4 05/23/2020     11/30/2022     05/23/2020     Sodium   Date Value Ref Range Status   11/30/2022 140 136 - 145 mmol/L Final   05/23/2020 140 133 - 144 mmol/L Final     Potassium   Date Value Ref Range Status   11/30/2022 3.6 3.4 - 5.3 mmol/L Final   05/23/2020 3.5 3.4 - 5.3 mmol/L Final     Chloride   Date Value Ref Range Status   11/30/2022 103 98 - 107 mmol/L Final   05/23/2020 108 94 - 109 mmol/L Final     Carbon Dioxide   Date Value Ref Range Status   05/23/2020 23 " 20 - 32 mmol/L Final     Carbon Dioxide (CO2)   Date Value Ref Range Status   11/30/2022 26 22 - 29 mmol/L Final     Anion Gap   Date Value Ref Range Status   11/30/2022 11 7 - 15 mmol/L Final   05/23/2020 9 3 - 14 mmol/L Final     Glucose   Date Value Ref Range Status   11/30/2022 116 (H) 70 - 99 mg/dL Final   05/23/2020 115 (H) 70 - 99 mg/dL Final     Urea Nitrogen   Date Value Ref Range Status   11/30/2022 14.8 6.0 - 20.0 mg/dL Final   05/23/2020 14 7 - 30 mg/dL Final     Creatinine   Date Value Ref Range Status   11/30/2022 0.78 0.51 - 0.95 mg/dL Final   05/23/2020 0.71 0.52 - 1.04 mg/dL Final     GFR Estimate   Date Value Ref Range Status   11/30/2022 89 >60 mL/min/1.73m2 Final     Comment:     Effective December 21, 2021 eGFRcr in adults is calculated using the 2021 CKD-EPI creatinine equation which includes age and gender (Jimbo et al., NEJ, DOI: 10.1056/IPBLlv1148747)   05/23/2020 >90 >60 mL/min/[1.73_m2] Final     Comment:     Non  GFR Calc  Starting 12/18/2018, serum creatinine based estimated GFR (eGFR) will be   calculated using the Chronic Kidney Disease Epidemiology Collaboration   (CKD-EPI) equation.       Calcium   Date Value Ref Range Status   11/30/2022 9.8 8.6 - 10.0 mg/dL Final   05/23/2020 8.9 8.5 - 10.1 mg/dL Final       Service Date: 12/28/2022    CONSULT INDICATION:  Hypertension.    HISTORY OF PRESENT ILLNESS:      I had the opportunity to see patient, Flores Sandhu, today in Cardiology Clinic for a consultation.  She is followed by our colleague, Brittany Frye, with Primary Care.    As you know, patient is a pleasant 57-year-old female with a past medical history significant for obesity and hypertension, who presents for further evaluation and management of hypertension.    The patient reports that at baseline, she exercises regularly by walking for 2-4 miles almost every day.  She also engages in exercises such as yoga.  She denies any chest pain, chest pressure or  associated palpitations.  However, she has noticed more dyspnea on exertion over the past couple of months or so.  She was recently visiting her parents and forgot to bring her losartan and so she had missed approximately 2 days of her losartan and developed symptoms consistent with hypertension, such as headache.  She resumed her losartan.  However, her blood pressures remained elevated.  She called her primary care team and was directed to the Emergency Department.  In the Emergency Department 11/30/2022, ECG demonstrated normal sinus rhythm without acute ischemic changes.  A CT head was negative.  Labs were unremarkable.  High-sensitivity troponin was normal.  Unfortunately, I do not see records of her blood pressure at that time; however, the patient notes that it was markedly elevated.  She was started on hydrochlorothiazide.  She filled the prescription, however, was told by the pharmacist that she should interface with her primary care team because she does have a sulfa allergy.  Rather than start the hydrochlorothiazide, she was directed to increase her dose of losartan from 50 mg daily to 100 mg daily.    The patient also has been experiencing some palpitations described as a skipped heartbeat sensation, as well as a prominent heartbeat sensation.  These symptoms occur sporadically and occur primarily at night, waking her up from sleep.  A Zio Patch was done from 12/01-12/08/2022, which demonstrated normal sinus rhythm with less than 1% PACs and less than 1% PVCs.  The patient does report that she snores quite a bit and has been experiencing some daytime somnolence.    She drinks alcohol occasionally.  Does not smoke cigarettes.  The patient has been losing weight through diet and exercise; however, she did gain some weight back over the holiday season.  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    Blood pressure today in clinic  was 154/102 mmHg.    ASSESSMENT AND PLAN/RECOMMENDATIONS:    1.  Hypertension.  Clinical history suggests possible underlying sleep apnea, which may be contributing to challenging to control blood pressures.  Unfortunately, due to a sulfa allergy, she is not a candidate for hydrochlorothiazide.  She remains on losartan 100 mg daily.  Blood pressure remains elevated today in clinic.  2.  Dyspnea on exertion, etiology not entirely clear.  Symptoms are not classically characteristic of myocardial ischemia; However, she does have risk factors of overweight, hypertension.  Suspect that symptoms may be related to poorly-controlled hypertension.  3.  Obesity.    -Discussed options for further evaluation and management.  -We will start amlodipine 5 mg daily.  Anticipate this will need to be increased; however, we will start at a lower dose for now.  -Continue losartan 100 mg daily.  -We will check basic labs including a BMP, and TSH.  -Exercise ECG treadmill stress test.  -TTE.  -Referral to Sleep Medicine.  -Follow up in 1-2 months with Cardiology [facility] or sooner as needed.    Thank you for allowing our team to participate in the care of patient, Flores Dixon.  Please do not hesitate to page or call with any questions or concerns.    Addi Ford MD, [facility]  Cardiology  2022          D: 2022   T: 2022   MT: al    Name:     FLORES DIXON  MRN:      [id number]        Account:      [id number]   :      1965           Service Date: 2022       Document: [id number]    Thank you for allowing me to participate in the care of your patient.      Sincerely,     Addi Ford MD     [facility]  cc:   Geo Crowell MD  [address] [place] [address] [place] [id number]

## 2022-12-29 NOTE — RESULT ENCOUNTER NOTE
Results reviewed, please let the patient know that overall findings are reassuring, labs unremarkable. Follow up as previously planned, thanks!

## 2023-01-02 ENCOUNTER — HOSPITAL ENCOUNTER (OUTPATIENT)
Dept: CARDIOLOGY | Facility: CLINIC | Age: 58
Discharge: HOME | End: 2023-01-02
Attending: INTERNAL MEDICINE
Payer: COMMERCIAL

## 2023-01-02 DIAGNOSIS — R06.09 DYSPNEA ON EXERTION: ICD-10-CM

## 2023-01-02 LAB — LVEF ECHO: NORMAL

## 2023-01-02 PROCEDURE — 93306 TTE W/DOPPLER COMPLETE: CPT | Mod: 26 | Performed by: INTERNAL MEDICINE

## 2023-01-02 PROCEDURE — 93018 CV STRESS TEST I&R ONLY: CPT | Performed by: INTERNAL MEDICINE

## 2023-01-02 PROCEDURE — 93306 TTE W/DOPPLER COMPLETE: CPT

## 2023-01-02 PROCEDURE — 93017 CV STRESS TEST TRACING ONLY: CPT

## 2023-01-02 PROCEDURE — 93016 CV STRESS TEST SUPVJ ONLY: CPT | Performed by: INTERNAL MEDICINE

## 2023-01-02 NOTE — RESULT ENCOUNTER NOTE
Results reviewed, please let the patient know that overall findings are reassuring, negative for ischemic changes, however she did have a hypertensive response to exercise. Recommend increasing amlodipine to 5mg BID, and starting carvedilol 3.125mg BID, we should also check renal artery ultrasounds, thanks!

## 2023-01-03 NOTE — RESULT ENCOUNTER NOTE
Results reviewed, please let the patient know that overall findings are reassuring, normal cardiac structure and function. BP was elevated however we recommended changes yesterday already thanks!

## 2023-01-04 ENCOUNTER — TELEPHONE (OUTPATIENT)
Dept: CARDIOLOGY | Facility: CLINIC | Age: 58
End: 2023-01-04

## 2023-01-04 DIAGNOSIS — I10 UNCONTROLLED HYPERTENSION: Primary | ICD-10-CM

## 2023-01-04 RX ORDER — AMLODIPINE BESYLATE 5 MG/1
5 TABLET ORAL 2 TIMES DAILY
Qty: 180 TABLET | Refills: 1 | Status: SHIPPED | OUTPATIENT
Start: 2023-01-04 | End: 2023-07-11

## 2023-01-04 RX ORDER — CARVEDILOL 3.12 MG/1
3.12 TABLET ORAL 2 TIMES DAILY WITH MEALS
Qty: 180 TABLET | Refills: 1 | Status: SHIPPED | OUTPATIENT
Start: 2023-01-04 | End: 2023-07-14

## 2023-01-04 NOTE — TELEPHONE ENCOUNTER
Call placed to pt to review recent testing and recommendations:     Interpretation Summary     The left ventricle is normal in structure, function and size.  The visual ejection fraction is 55-60%.  The right ventricle is normal in structure, function and size.  Doppler interrogation does not demonstrate significant stenosis or  insufficienvcy involving cardiac valves     No old studies for comparison      ----- Message from Addi Ford MD sent at 1/3/2023  4:21 PM CST -----  Results reviewed, please let the patient know that overall findings are reassuring, normal cardiac structure and function. BP was elevated however we recommended changes yesterday already thanks!    Stress test:   There was a hypertensive BP response to exercise with peak systolic blood  pressure of 230.  This was a normal stress EKG with no evidence of stress-induced ischemia.     Addi Ford MD   1/2/2023  5:14 PM CST       Results reviewed, please let the patient know that overall findings are reassuring, negative for ischemic changes, however she did have a hypertensive response to exercise. Recommend increasing amlodipine to 5mg BID, and starting carvedilol 3.125mg BID, we should also check renal artery ultrasounds, thanks!       Pt verbalized understanding. Orders placed per MD. Pt advised to call with any questions / concerns.   JEANNA Blanco RN, BSN. 01/04/23 5:23 PM

## 2023-01-16 ENCOUNTER — ANCILLARY PROCEDURE (OUTPATIENT)
Dept: ULTRASOUND IMAGING | Facility: CLINIC | Age: 58
End: 2023-01-16
Attending: INTERNAL MEDICINE
Payer: COMMERCIAL

## 2023-01-16 DIAGNOSIS — I10 UNCONTROLLED HYPERTENSION: ICD-10-CM

## 2023-01-16 PROCEDURE — 93975 VASCULAR STUDY: CPT | Performed by: RADIOLOGY

## 2023-01-17 NOTE — RESULT ENCOUNTER NOTE
Results reviewed, please let the patient know that overall findings are reassuring, negative for evidence of renal artery stenosis. Follow up as previously planned, thanks!

## 2023-02-21 NOTE — PROGRESS NOTES
HISTORY OF PRESENT ILLNESS:    This is a 58 year old female who follows with Dr Ford at [facility]  Her past medical history includes:  Hypertension, palpitations, and obesity    Ms Sandhu has been treated for hypertension for some time. She developed dyspnea on exertion and complained of palpitations a few months ago. A Ziopatch monitor and treadmill stress test were arranged.    Her ZioPatch monitor (12/2022) showed sinus rhythm with rare PACs and PVCs    Her treadmill stress test (1/2/23) did not show any stress-induced ischemia  She did have mild hypertension and Amlodipine was added.  ECHO (1/2/23) showed LVEF 55% without wall motion abnormalities, normal RV function, no significant valvular pathology, normal aorta size.  She was referred for sleep evaluation.    Renal ultrasound (1/6/23) was negative for renal artery stenosis    Since then, it appears that Coreg was started and Amlodipine increased    Our visit today is for further review.    Ms Sandhu is tolerating the new medications  She is able to do all her activities without any limitations.  She is trying to exercise more.  She denies any chest pain, shortness of breath, palpitations, orthopnea, or peripheral edema.  She is awaiting a sleep evaluation which is scheduled for May  She does admit to stress with her adult children and aging parents.   We talked about lifestyle modifications including avoiding salty foods, caffeine, weight loss, and limiting alcohol.      VITAL SIGNS:  BP: 136/72  Pulse:  64  Weight:  192 lbs (BMI: 30)      IMPRESSION AND PLAN:    Hypertension:  -on Amlodipine 5 mg BID, Losartan 100 mg, Coreg 3.125 mg BID  -BP controlled  -she will call with persistent BP > 140/90    Possible Sleep Apnea  -awaiting sleep evaluation    The total time for the visit today was 20 minutes which includes patient visit, reviewing of records, discussion, and placing of orders of the outpatient coordination of cardiovascular care as  described.  The level of medical decision making during this visit was of mild-moderate complexity.  Thank you for allowing me to participate in their care.      Orders Placed This Encounter   Procedures     Follow-Up with Cardiology       No orders of the defined types were placed in this encounter.      There are no discontinued medications.      Encounter Diagnoses   Name Primary?     Uncontrolled hypertension      Dyspnea on exertion      Benign essential hypertension Yes       CURRENT MEDICATIONS:  Current Outpatient Medications   Medication Sig Dispense Refill     amLODIPine (NORVASC) 5 MG tablet Take 1 tablet (5 mg) by mouth 2 times daily 180 tablet 1     carvedilol (COREG) 3.125 MG tablet Take 1 tablet (3.125 mg) by mouth 2 times daily (with meals) 180 tablet 1     losartan (COZAAR) 100 MG tablet Take 1 tablet by mouth daily       amLODIPine (NORVASC) 5 MG tablet Take 1 tablet (5 mg) by mouth daily (Patient not taking: Reported on 2/27/2023) 90 tablet 3       ALLERGIES     Allergies   Allergen Reactions     Penicillins Unknown     4/27/2015 Test dosed cefazolin with no side effects seen     Sulfa Drugs Hives       PAST MEDICAL HISTORY:  Past Medical History:   Diagnosis Date     Palpitations 12/28/2022     Uncontrolled hypertension 12/28/2022       PAST SURGICAL HISTORY:  No past surgical history on file.    FAMILY HISTORY:  History reviewed. No pertinent family history.    SOCIAL HISTORY:  Social History     Socioeconomic History     Marital status:      Spouse name: None     Number of children: None     Years of education: None     Highest education level: None   Tobacco Use     Smoking status: Never     Smokeless tobacco: Never   Substance and Sexual Activity     Alcohol use: Yes     Comment: socially       Review of Systems:  Skin:  not assessed       Eyes:  not assessed      ENT:  not assessed      Respiratory:  Positive for cough;wheezing coughing in AM - possible conestion per pt  "  Cardiovascular:  Negative      Gastroenterology: not assessed      Genitourinary:  not assessed      Musculoskeletal:  not assessed      Neurologic:  not assessed      Psychiatric:  not assessed      Heme/Lymph/Imm:  not assessed      Endocrine:  not assessed        Physical Exam:  Vitals: /72   Pulse 64   Ht 1.676 m (5' 6\")   Wt 87.1 kg (192 lb)   BMI 30.99 kg/m      Constitutional:  cooperative overweight      Skin:  warm and dry to the touch          Head:  normocephalic        Eyes:  pupils equal and round        Lymph:      ENT:  no pallor or cyanosis        Neck:  JVP normal;no carotid bruit        Respiratory:  clear to auscultation;normal respiratory excursion         Cardiac: regular rhythm;normal S1 and S2     no presence of murmur          pulses full and equal                                        GI:  abdomen soft        Extremities and Muscular Skeletal:  no edema              Neurological:  affect appropriate        Psych:  Alert and Oriented x 3          CC  Addi Ford MD  [address]  [place] [id number]                  "

## 2023-02-27 ENCOUNTER — OFFICE VISIT (OUTPATIENT)
Dept: CARDIOLOGY | Facility: CLINIC | Age: 58
End: 2023-02-27
Attending: INTERNAL MEDICINE
Payer: COMMERCIAL

## 2023-02-27 VITALS
BODY MASS INDEX: 30.86 KG/M2 | DIASTOLIC BLOOD PRESSURE: 72 MMHG | HEART RATE: 64 BPM | WEIGHT: 192 LBS | HEIGHT: 66 IN | SYSTOLIC BLOOD PRESSURE: 136 MMHG

## 2023-02-27 DIAGNOSIS — I10 BENIGN ESSENTIAL HYPERTENSION: Primary | ICD-10-CM

## 2023-02-27 DIAGNOSIS — R06.09 DYSPNEA ON EXERTION: ICD-10-CM

## 2023-02-27 DIAGNOSIS — I10 UNCONTROLLED HYPERTENSION: ICD-10-CM

## 2023-02-27 PROCEDURE — 99213 OFFICE O/P EST LOW 20 MIN: CPT | Performed by: NURSE PRACTITIONER

## 2023-02-27 NOTE — LETTER
2/27/2023    Brittany Frye PA-C  [facility] [address] [address]    RE: Flores Sandhu       Dear Colleague,     I had the pleasure of seeing Flores Sandhu in the [facility].  HISTORY OF PRESENT ILLNESS:    This is a 58 year old female who follows with Dr Ford at [facility]  Her past medical history includes:  Hypertension, palpitations, and obesity    Ms Sandhu has been treated for hypertension for some time. She developed dyspnea on exertion and complained of palpitations a few months ago. A Ziopatch monitor and treadmill stress test were arranged.    Her ZioPatch monitor (12/2022) showed sinus rhythm with rare PACs and PVCs    Her treadmill stress test (1/2/23) did not show any stress-induced ischemia  She did have mild hypertension and Amlodipine was added.  ECHO (1/2/23) showed LVEF 55% without wall motion abnormalities, normal RV function, no significant valvular pathology, normal aorta size.  She was referred for sleep evaluation.    Renal ultrasound (1/6/23) was negative for renal artery stenosis    Since then, it appears that Coreg was started and Amlodipine increased    Our visit today is for further review.    Ms Sandhu is tolerating the new medications  She is able to do all her activities without any limitations.  She is trying to exercise more.  She denies any chest pain, shortness of breath, palpitations, orthopnea, or peripheral edema.  She is awaiting a sleep evaluation which is scheduled for May  She does admit to stress with her adult children and aging parents.   We talked about lifestyle modifications including avoiding salty foods, caffeine, weight loss, and limiting alcohol.      VITAL SIGNS:  BP: 136/72  Pulse:  64  Weight:  192 lbs (BMI: 30)      IMPRESSION AND PLAN:    Hypertension:  -on Amlodipine 5 mg BID, Losartan 100 mg, Coreg 3.125 mg BID  -BP controlled  -she will call with persistent BP > 140/90    Possible Sleep  Apnea  -awaiting sleep evaluation    The total time for the visit today was 20 minutes which includes patient visit, reviewing of records, discussion, and placing of orders of the outpatient coordination of cardiovascular care as described.  The level of medical decision making during this visit was of mild-moderate complexity.  Thank you for allowing me to participate in their care.      Orders Placed This Encounter   Procedures     Follow-Up with Cardiology       No orders of the defined types were placed in this encounter.      There are no discontinued medications.      Encounter Diagnoses   Name Primary?     Uncontrolled hypertension      Dyspnea on exertion      Benign essential hypertension Yes       CURRENT MEDICATIONS:  Current Outpatient Medications   Medication Sig Dispense Refill     amLODIPine (NORVASC) 5 MG tablet Take 1 tablet (5 mg) by mouth 2 times daily 180 tablet 1     carvedilol (COREG) 3.125 MG tablet Take 1 tablet (3.125 mg) by mouth 2 times daily (with meals) 180 tablet 1     losartan (COZAAR) 100 MG tablet Take 1 tablet by mouth daily       amLODIPine (NORVASC) 5 MG tablet Take 1 tablet (5 mg) by mouth daily (Patient not taking: Reported on 2/27/2023) 90 tablet 3       ALLERGIES     Allergies   Allergen Reactions     Penicillins Unknown     4/27/2015 Test dosed cefazolin with no side effects seen     Sulfa Drugs Hives       PAST MEDICAL HISTORY:  Past Medical History:   Diagnosis Date     Palpitations 12/28/2022     Uncontrolled hypertension 12/28/2022       PAST SURGICAL HISTORY:  No past surgical history on file.    FAMILY HISTORY:  History reviewed. No pertinent family history.    SOCIAL HISTORY:  Social History     Socioeconomic History     Marital status:      Spouse name: None     Number of children: None     Years of education: None     Highest education level: None   Tobacco Use     Smoking status: Never     Smokeless tobacco: Never   Substance and Sexual Activity     Alcohol  "use: Yes     Comment: socially       Review of Systems:  Skin:  not assessed       Eyes:  not assessed      ENT:  not assessed      Respiratory:  Positive for cough;wheezing coughing in AM - possible conestion per pt   Cardiovascular:  Negative      Gastroenterology: not assessed      Genitourinary:  not assessed      Musculoskeletal:  not assessed      Neurologic:  not assessed      Psychiatric:  not assessed      Heme/Lymph/Imm:  not assessed      Endocrine:  not assessed        Physical Exam:  Vitals: /72   Pulse 64   Ht 1.676 m (5' 6\")   Wt 87.1 kg (192 lb)   BMI 30.99 kg/m      Constitutional:  cooperative overweight      Skin:  warm and dry to the touch          Head:  normocephalic        Eyes:  pupils equal and round        Lymph:      ENT:  no pallor or cyanosis        Neck:  JVP normal;no carotid bruit        Respiratory:  clear to auscultation;normal respiratory excursion         Cardiac: regular rhythm;normal S1 and S2     no presence of murmur          pulses full and equal                                        GI:  abdomen soft        Extremities and Muscular Skeletal:  no edema              Neurological:  affect appropriate        Psych:  Alert and Oriented x 3          CC  Addi Ford MD  [address], [address]  [place] [address]    Thank you for allowing me to participate in the care of your patient.      Sincerely,     LUIS FERNANDO Burton CNP     [facility]  "

## 2023-03-26 ENCOUNTER — HEALTH MAINTENANCE LETTER (OUTPATIENT)
Age: 58
End: 2023-03-26

## 2023-07-11 DIAGNOSIS — I10 UNCONTROLLED HYPERTENSION: ICD-10-CM

## 2023-07-11 RX ORDER — AMLODIPINE BESYLATE 5 MG/1
5 TABLET ORAL 2 TIMES DAILY
Qty: 180 TABLET | Refills: 1 | Status: SHIPPED | OUTPATIENT
Start: 2023-07-11

## 2023-07-14 DIAGNOSIS — I10 UNCONTROLLED HYPERTENSION: ICD-10-CM

## 2023-07-14 RX ORDER — CARVEDILOL 3.12 MG/1
3.12 TABLET ORAL 2 TIMES DAILY WITH MEALS
Qty: 180 TABLET | Refills: 0 | Status: SHIPPED | OUTPATIENT
Start: 2023-07-14 | End: 2023-10-16

## 2023-10-16 DIAGNOSIS — I10 UNCONTROLLED HYPERTENSION: ICD-10-CM

## 2023-10-16 RX ORDER — CARVEDILOL 3.12 MG/1
3.12 TABLET ORAL 2 TIMES DAILY WITH MEALS
Qty: 180 TABLET | Refills: 0 | Status: SHIPPED | OUTPATIENT
Start: 2023-10-16

## 2024-06-01 ENCOUNTER — HEALTH MAINTENANCE LETTER (OUTPATIENT)
Age: 59
End: 2024-06-01

## 2025-04-12 ENCOUNTER — HEALTH MAINTENANCE LETTER (OUTPATIENT)
Age: 60
End: 2025-04-12

## 2025-06-14 ENCOUNTER — HEALTH MAINTENANCE LETTER (OUTPATIENT)
Age: 60
End: 2025-06-14